# Patient Record
Sex: FEMALE | Race: OTHER | HISPANIC OR LATINO
[De-identification: names, ages, dates, MRNs, and addresses within clinical notes are randomized per-mention and may not be internally consistent; named-entity substitution may affect disease eponyms.]

---

## 2021-04-15 PROBLEM — Z00.00 ENCOUNTER FOR PREVENTIVE HEALTH EXAMINATION: Status: ACTIVE | Noted: 2021-04-15

## 2021-04-26 ENCOUNTER — APPOINTMENT (OUTPATIENT)
Dept: ORTHOPEDIC SURGERY | Facility: CLINIC | Age: 71
End: 2021-04-26
Payer: MEDICARE

## 2021-04-26 ENCOUNTER — OUTPATIENT (OUTPATIENT)
Dept: OUTPATIENT SERVICES | Facility: HOSPITAL | Age: 71
LOS: 1 days | End: 2021-04-26
Payer: MEDICARE

## 2021-04-26 ENCOUNTER — RESULT REVIEW (OUTPATIENT)
Age: 71
End: 2021-04-26

## 2021-04-26 VITALS
WEIGHT: 160 LBS | BODY MASS INDEX: 26.66 KG/M2 | TEMPERATURE: 97.9 F | OXYGEN SATURATION: 98 % | HEART RATE: 84 BPM | HEIGHT: 65 IN | DIASTOLIC BLOOD PRESSURE: 84 MMHG | SYSTOLIC BLOOD PRESSURE: 114 MMHG

## 2021-04-26 DIAGNOSIS — Z78.9 OTHER SPECIFIED HEALTH STATUS: ICD-10-CM

## 2021-04-26 DIAGNOSIS — Z80.9 FAMILY HISTORY OF MALIGNANT NEOPLASM, UNSPECIFIED: ICD-10-CM

## 2021-04-26 DIAGNOSIS — Z72.3 LACK OF PHYSICAL EXERCISE: ICD-10-CM

## 2021-04-26 DIAGNOSIS — M25.461 EFFUSION, RIGHT KNEE: ICD-10-CM

## 2021-04-26 PROCEDURE — 20611 DRAIN/INJ JOINT/BURSA W/US: CPT | Mod: RT

## 2021-04-26 PROCEDURE — 99204 OFFICE O/P NEW MOD 45 MIN: CPT | Mod: 25

## 2021-04-26 PROCEDURE — 73564 X-RAY EXAM KNEE 4 OR MORE: CPT | Mod: 26,LT,RT

## 2021-04-26 PROCEDURE — 73564 X-RAY EXAM KNEE 4 OR MORE: CPT

## 2021-04-26 PROCEDURE — 99072 ADDL SUPL MATRL&STAF TM PHE: CPT

## 2021-04-26 RX ORDER — HYALURONATE SODIUM, STABILIZED 88 MG/4 ML
88 SYRINGE (ML) INTRAARTICULAR
Qty: 2 | Refills: 0 | Status: ACTIVE | COMMUNITY
Start: 2021-04-26

## 2021-04-26 NOTE — REVIEW OF SYSTEMS
[Negative] : Heme/Lymph [Joint Pain] : joint pain [Joint Stiffness] : joint stiffness [Joint Swelling] : joint swelling [Sleep Disturbances] : ~T sleep disturbances

## 2021-04-26 NOTE — PHYSICAL EXAM
[de-identified] : General: Well-nourished, well-developed, alert, and in no acute distress.\par Head: Normocephalic.\par Eyes: Pupils equal round reactive to light and accommodation, extraocular muscles intact, normal sclera.\par Nose: No nasal discharge.\par Cardiac: Regular rate. Extremities are warm and well perfused. Distal pulses are symmetric bilaterally.\par Respiratory: No labored breathing.\par Extremities: Sensation is intact distally bilaterally.  Distal pulses are symmetric bilaterally\par Lymphatic: No regional lymphadenopathy, no lymphedema\par Neurologic: No focal deficits\par Skin: Normal skin color, texture, and turgor\par Psychiatric: Normal affect\par MSK: as noted above/below\par \par \par \par RIGHT KNEE:\par \par Inspection: no bruising, erythema\par Joint Effusion:MODERATE\par ROM: Knee Flexion 125 , Knee Extension 0\par Palpation:MEDIAL JOINT LINE PAIN, PERIPATELLAR PAIN, No pain at patellar tendon, MFC/LFC, Medial/Lateral Tibial Plateau\par Leg Length Discrepancy:no\par Patella: no apprehension\par Distal Pulses: normal\par Lower Extremity Strength:normal, 5/5 \par Lower Extremity Reflexes:normal, 2+\par Lower Extremity Sensation: normal\par \par Special Tests:\par Kike:Negative \par Melissa: Negative\par Anterior Drawer:Negative\par Posterior Drawer:Negative \par Varus/Valgus:Negative, no instability\par \par LEFT KNEE:\par \par Inspection: no bruising, swelling, erythema\par Joint Effusion:no \par ROM: Knee Flexion 130 , Knee Extension 0\par Palpation:MEDIAL JOINT LINE PAIN, PERIPATELLAR PAIN, No pain at patellar tendon, MFC/LFC, Medial/Lateral Tibial Plateau\par Leg Length Discrepancy:no\par Patella: no apprehension\par Distal Pulses: normal\par Lower Extremity Strength:normal, 5/5 \par Lower Extremity Reflexes:normal, 2+\par Lower Extremity Sensation: normal\par \par Special Tests:\par Kike:Negative \par Melissa: Negative\par Anterior Drawer:Negative\par Posterior Drawer:Negative \par Varus/Valgus:Negative, no instability\par \par \par  [de-identified] : Xray Bilateral Knees - Multiple views were reviewed with the patient in detail.  \par \par There is no fracture or dislocation. There are tricompartmental degenerative changes of both knees, most pronounced in the patellofemoral compartments, left greater than right.  There is no focal soft tissue abnormality.\par

## 2021-04-26 NOTE — PROCEDURE
[de-identified] : Ultrasound-Guided RIGHT Knee Arthrocentesis\par \par Indication for U/S Guidance: Ensure placement within the tibiofemoral joint for diagnostic purposes, while avoiding neurovascular structures\par \par Indication for Injection: KNEE EFFUSION\par \par A discussion was had with the patient regarding this procedure and all questions were answered. All risks, benefits and alternatives were discussed. These included but were not limited to bleeding, infection, and allergic reaction. A timeout was done to ensure correct side and pt agreed to the procedure. Betadine was used to sterilize and prep the area, and alcohol was used to clean the skin in the anterior aspect of the knee joint. The suprapatellar space was visualized utilizing the Sonosite, linear transducer. The joint was visualized in the short axis and an in-plane approach was used for the injection. Ultrasound guidance was utilized to ensure accuracy of the intra-articular aspiration, and avoid the neurovascular structures. A 25-gauge 1.5" needle was used to inject 2cc of 1% lidocaine without epi into the SubQ.  This was followed by aspiration with an 18-gauge 1.5" needle with aspiration of 10CC OF NONPURULENT, CLEAR YELLOW SYNOVIAL FLUID.  A sterile bandage was then applied. The patient tolerated the procedure well and there were no complications.\par

## 2021-04-26 NOTE — HISTORY OF PRESENT ILLNESS
[de-identified] : The patient is a 70 year old woman presenting with knee pain.\par \par The patient presents with a several month history of chronic, atraumatic, bilateral knee pain, right worse than left.  Recently, she has noticed more pain and swelling on the right.  She was prescribed Meloxicam by her PMD, which has mildly improved her pain.  The patient denies mechanical symptoms including catching, locking, buckling.  She denies focal hip or groin pain.\par \par Pain is rated 10/10, described as sharp, improved with NSAIDs, worse with walking. [10] : a current pain level of 10/10

## 2021-05-20 ENCOUNTER — APPOINTMENT (OUTPATIENT)
Dept: ORTHOPEDIC SURGERY | Facility: CLINIC | Age: 71
End: 2021-05-20

## 2021-06-03 ENCOUNTER — APPOINTMENT (OUTPATIENT)
Dept: ORTHOPEDIC SURGERY | Facility: CLINIC | Age: 71
End: 2021-06-03
Payer: MEDICARE

## 2021-06-03 VITALS
OXYGEN SATURATION: 98 % | DIASTOLIC BLOOD PRESSURE: 70 MMHG | BODY MASS INDEX: 26.66 KG/M2 | TEMPERATURE: 97.7 F | HEART RATE: 75 BPM | HEIGHT: 65 IN | SYSTOLIC BLOOD PRESSURE: 127 MMHG | WEIGHT: 160 LBS

## 2021-06-03 PROCEDURE — 20610 DRAIN/INJ JOINT/BURSA W/O US: CPT | Mod: RT

## 2021-06-03 NOTE — PROCEDURE
[de-identified] : Injection: Left knee joint.\par Indication: Osteoarthritis.\par \par A discussion was had with the patient regarding this procedure and all questions were answered. All risks, benefits and alternatives were discussed. These included but were not limited to bleeding, infection, and allergic reaction. Alcohol was used to clean the skin, and betadine was used to sterilize and prep the area in the lateral joint line aspect of the knee. Ethyl chloride spray was then used as a topical anesthetic. A 22-gauge needle was used to inject 2 cc of lidocaine 1% without epinephrine into the SubQ.  This was followed by 2cc of Euflexxa injection #1 into the knee with ease. A sterile bandage was then applied. The patient tolerated the procedure well and there were no complications. \par \par Lot #:  [id number]\par Exp:  2022-04-18\par \par _____________________________________\par \par Injection: Right knee joint.\par Indication: Osteoarthritis.\par \par A discussion was had with the patient regarding this procedure and all questions were answered. All risks, benefits and alternatives were discussed. These included but were not limited to bleeding, infection, and allergic reaction. Alcohol was used to clean the skin, and betadine was used to sterilize and prep the area in the lateral joint line aspect of the knee. Ethyl chloride spray was then used as a topical anesthetic. A 22-gauge needle was used to inject 2 cc of lidocaine 1% without epinephrine into the SubQ.  This was followed by 2cc of Euflexxa injection #1 into the knee with ease. A sterile bandage was then applied. The patient tolerated the procedure well and there were no complications. \par \par \par Lot #:  [id number]\par Exp:  2022-04-18\par  Shortness of breath Shortness of breath Shortness of breath Shortness of breath Atrial fibrillation with RVR Atrial fibrillation with RVR Atrial fibrillation with RVR

## 2021-06-10 ENCOUNTER — APPOINTMENT (OUTPATIENT)
Dept: ORTHOPEDIC SURGERY | Facility: CLINIC | Age: 71
End: 2021-06-10
Payer: MEDICARE

## 2021-06-10 VITALS
DIASTOLIC BLOOD PRESSURE: 80 MMHG | TEMPERATURE: 98 F | SYSTOLIC BLOOD PRESSURE: 138 MMHG | OXYGEN SATURATION: 97 % | HEART RATE: 78 BPM | HEIGHT: 65 IN | WEIGHT: 160 LBS | BODY MASS INDEX: 26.66 KG/M2

## 2021-06-10 PROCEDURE — 20610 DRAIN/INJ JOINT/BURSA W/O US: CPT | Mod: LT

## 2021-06-10 NOTE — PROCEDURE
[de-identified] : Injection: Left knee joint.\par Indication: Osteoarthritis.\par \par A discussion was had with the patient regarding this procedure and all questions were answered. All risks, benefits and alternatives were discussed. These included but were not limited to bleeding, infection, and allergic reaction. Alcohol was used to clean the skin, and betadine was used to sterilize and prep the area in the lateral joint line aspect of the knee. Ethyl chloride spray was then used as a topical anesthetic. A 22-gauge needle was used to inject 2 cc of lidocaine 1% without epinephrine into the SubQ.  This was followed by 2cc of Euflexxa injection #2 into the knee with ease. A sterile bandage was then applied. The patient tolerated the procedure well and there were no complications. \par \par Lot #:  [id number]\par Exp:  2022-04-18\par \par _____________________________________\par \par Injection: Right knee joint.\par Indication: Osteoarthritis.\par \par A discussion was had with the patient regarding this procedure and all questions were answered. All risks, benefits and alternatives were discussed. These included but were not limited to bleeding, infection, and allergic reaction. Alcohol was used to clean the skin, and betadine was used to sterilize and prep the area in the lateral joint line aspect of the knee. Ethyl chloride spray was then used as a topical anesthetic. A 22-gauge needle was used to inject 2 cc of lidocaine 1% without epinephrine into the SubQ.  This was followed by 2cc of Euflexxa injection #2 into the knee with ease. A sterile bandage was then applied. The patient tolerated the procedure well and there were no complications. \par \par \par Lot #:  [id number]\par Exp:  2022-04-18\par

## 2021-06-17 ENCOUNTER — APPOINTMENT (OUTPATIENT)
Dept: ORTHOPEDIC SURGERY | Facility: CLINIC | Age: 71
End: 2021-06-17
Payer: MEDICARE

## 2021-06-17 VITALS
HEART RATE: 83 BPM | DIASTOLIC BLOOD PRESSURE: 78 MMHG | BODY MASS INDEX: 26.66 KG/M2 | TEMPERATURE: 97.3 F | SYSTOLIC BLOOD PRESSURE: 120 MMHG | WEIGHT: 160 LBS | HEIGHT: 65 IN | OXYGEN SATURATION: 98 %

## 2021-06-17 PROCEDURE — 20610 DRAIN/INJ JOINT/BURSA W/O US: CPT | Mod: LT

## 2021-06-17 NOTE — PROCEDURE
[de-identified] : Injection: Left knee joint.\par Indication: Osteoarthritis.\par \par A discussion was had with the patient regarding this procedure and all questions were answered. All risks, benefits and alternatives were discussed. These included but were not limited to bleeding, infection, and allergic reaction. Alcohol was used to clean the skin, and betadine was used to sterilize and prep the area in the lateral joint line aspect of the knee. Ethyl chloride spray was then used as a topical anesthetic. A 22-gauge needle was used to inject 2 cc of lidocaine 1% without epinephrine into the SubQ.  This was followed by 2cc of Euflexxa injection #3 into the knee with ease. A sterile bandage was then applied. The patient tolerated the procedure well and there were no complications. \par \par Lot #:  [id number]\par Exp:  2022-04-18\par \par _____________________________________\par \par Injection: Right knee joint.\par Indication: Osteoarthritis.\par \par A discussion was had with the patient regarding this procedure and all questions were answered. All risks, benefits and alternatives were discussed. These included but were not limited to bleeding, infection, and allergic reaction. Alcohol was used to clean the skin, and betadine was used to sterilize and prep the area in the lateral joint line aspect of the knee. Ethyl chloride spray was then used as a topical anesthetic. A 22-gauge needle was used to inject 2 cc of lidocaine 1% without epinephrine into the SubQ.  This was followed by 2cc of Euflexxa injection #3 into the knee with ease. A sterile bandage was then applied. The patient tolerated the procedure well and there were no complications. \par \par \par Lot #:  [id number]\par Exp:  2022-04-18\par

## 2022-07-21 ENCOUNTER — APPOINTMENT (OUTPATIENT)
Dept: ORTHOPEDIC SURGERY | Facility: CLINIC | Age: 72
End: 2022-07-21

## 2022-07-21 VITALS
WEIGHT: 160 LBS | HEART RATE: 51 BPM | BODY MASS INDEX: 26.66 KG/M2 | SYSTOLIC BLOOD PRESSURE: 159 MMHG | HEIGHT: 65 IN | DIASTOLIC BLOOD PRESSURE: 80 MMHG

## 2022-07-21 DIAGNOSIS — M17.11 UNILATERAL PRIMARY OSTEOARTHRITIS, RIGHT KNEE: ICD-10-CM

## 2022-07-21 DIAGNOSIS — M17.12 UNILATERAL PRIMARY OSTEOARTHRITIS, LEFT KNEE: ICD-10-CM

## 2022-07-21 PROCEDURE — 73564 X-RAY EXAM KNEE 4 OR MORE: CPT | Mod: LT,RT

## 2022-07-21 PROCEDURE — 99204 OFFICE O/P NEW MOD 45 MIN: CPT

## 2022-07-21 RX ORDER — DICLOFENAC POTASSIUM 50 MG/1
50 TABLET, COATED ORAL TWICE DAILY
Qty: 30 | Refills: 1 | Status: ACTIVE | COMMUNITY
Start: 2022-07-21 | End: 1900-01-01

## 2022-07-21 RX ORDER — HYALURONATE SODIUM, STABILIZED 88 MG/4 ML
88 SYRINGE (ML) INTRAARTICULAR
Qty: 2 | Refills: 0 | Status: ACTIVE | COMMUNITY
Start: 2022-07-21 | End: 1900-01-01

## 2022-07-21 NOTE — HISTORY OF PRESENT ILLNESS
[de-identified] : 72 yo female here for left knee pain. She was previously a pt of Dr. Magaña and Recieved bilateral HA injections. The left knee is now having pain, but the right is still doing well. She has trouble up/down stairs and takes diclofenac for pain relief.

## 2022-07-21 NOTE — DISCUSSION/SUMMARY
[de-identified] : 72 yo female presents today for left knee pain. She has a history of bilateral OA and has been pain free up until now after receiving HA injections last year with recent exacerbation. . We will proceed with ordering her bilateral HA injections, she will f/u when then arrive.\par \par Plan\par HA injx ordered\par PT\par F/u 4-6 weeks for HA injection

## 2022-07-21 NOTE — PHYSICAL EXAM
[de-identified] : Left knee= ROM 0-100; Quad 4/5; PF crepitance; Medial joint tenderness; Neg Shannon/Melissa\par Right knee= ROM 0-120; Quad 4/5; Medial joint tenderness; Neg Shannon/Melissa [de-identified] : 4 views bilateral knees show no fracture or dislocation; tricompartment degenerative changes left more than right; osteophytes present

## 2022-09-01 ENCOUNTER — APPOINTMENT (OUTPATIENT)
Dept: ORTHOPEDIC SURGERY | Facility: CLINIC | Age: 72
End: 2022-09-01